# Patient Record
Sex: FEMALE | Race: WHITE
[De-identification: names, ages, dates, MRNs, and addresses within clinical notes are randomized per-mention and may not be internally consistent; named-entity substitution may affect disease eponyms.]

---

## 2020-12-27 ENCOUNTER — HOSPITAL ENCOUNTER (EMERGENCY)
Dept: HOSPITAL 96 - M.ERS | Age: 35
LOS: 1 days | Discharge: HOME | End: 2020-12-28
Payer: COMMERCIAL

## 2020-12-27 VITALS — BODY MASS INDEX: 26.68 KG/M2 | WEIGHT: 166.01 LBS | HEIGHT: 66 IN

## 2020-12-27 DIAGNOSIS — Z88.2: ICD-10-CM

## 2020-12-27 DIAGNOSIS — K50.90: Primary | ICD-10-CM

## 2020-12-27 LAB
ABSOLUTE BASOPHILS: 0.1 THOU/UL (ref 0–0.2)
ABSOLUTE EOSINOPHILS: 0.1 THOU/UL (ref 0–0.7)
ABSOLUTE MONOCYTES: 1 THOU/UL (ref 0–1.2)
ALBUMIN SERPL-MCNC: 3.3 G/DL (ref 3.4–5)
ALP SERPL-CCNC: 71 U/L (ref 46–116)
ALT SERPL-CCNC: 25 U/L (ref 30–65)
ANION GAP SERPL CALC-SCNC: 5 MMOL/L (ref 7–16)
AST SERPL-CCNC: 14 U/L (ref 15–37)
BASOPHILS NFR BLD AUTO: 0.4 %
BILIRUB SERPL-MCNC: 0.2 MG/DL
BILIRUB UR-MCNC: NEGATIVE MG/DL
BUN SERPL-MCNC: 21 MG/DL (ref 7–18)
CALCIUM SERPL-MCNC: 8.8 MG/DL (ref 8.5–10.1)
CHLORIDE SERPL-SCNC: 102 MMOL/L (ref 98–107)
CO2 SERPL-SCNC: 30 MMOL/L (ref 21–32)
COLOR UR: YELLOW
CREAT SERPL-MCNC: 0.8 MG/DL (ref 0.6–1.3)
EOSINOPHIL NFR BLD: 1 %
GLUCOSE SERPL-MCNC: 106 MG/DL (ref 70–99)
GRANULOCYTES NFR BLD MANUAL: 74.9 %
HCT VFR BLD CALC: 38.7 % (ref 37–47)
HGB BLD-MCNC: 12.8 GM/DL (ref 12–15)
KETONES UR STRIP-MCNC: NEGATIVE MG/DL
LIPASE: 86 U/L (ref 73–393)
LYMPHOCYTES # BLD: 1.9 THOU/UL (ref 0.8–5.3)
LYMPHOCYTES NFR BLD AUTO: 15.5 %
MCH RBC QN AUTO: 28.5 PG (ref 26–34)
MCHC RBC AUTO-ENTMCNC: 33.2 G/DL (ref 28–37)
MCV RBC: 85.9 FL (ref 80–100)
MONOCYTES NFR BLD: 8.2 %
MPV: 8.1 FL. (ref 7.2–11.1)
NEUTROPHILS # BLD: 9.1 THOU/UL (ref 1.6–8.1)
NUCLEATED RBCS: 0 /100WBC
PLATELET COUNT*: 331 THOU/UL (ref 150–400)
POTASSIUM SERPL-SCNC: 3.7 MMOL/L (ref 3.5–5.1)
PROT SERPL-MCNC: 7.3 G/DL (ref 6.4–8.2)
PROT UR QL STRIP: NEGATIVE
RBC # BLD AUTO: 4.51 MIL/UL (ref 4.2–5)
RBC # UR STRIP: NEGATIVE /UL
RDW-CV: 13 % (ref 10.5–14.5)
SODIUM SERPL-SCNC: 137 MMOL/L (ref 136–145)
SP GR UR STRIP: >= 1.03 (ref 1–1.03)
URINE CLARITY: CLEAR
URINE GLUCOSE-RANDOM: NEGATIVE
URINE LEUKOCYTES-REFLEX: NEGATIVE
URINE NITRITE-REFLEX: NEGATIVE
UROBILINOGEN UR STRIP-ACNC: 0.2 E.U./DL (ref 0.2–1)
WBC # BLD AUTO: 12.2 THOU/UL (ref 4–11)

## 2020-12-28 VITALS — SYSTOLIC BLOOD PRESSURE: 105 MMHG | DIASTOLIC BLOOD PRESSURE: 58 MMHG

## 2020-12-28 NOTE — EKG
[place] [address]
Phone:  [phone]                     ELECTROCARDIOGRAM REPORT      
_______________________________________________________________________________
 
Name:         MIRTA MCCRACKEN             Room:                     [facility]#:    [id number]     Account #:     [id number]  
Admission:    20    Attend Phys:                     
Discharge:    20    Date of Birth: [birth year]  
Date of Service: 20  Report #:      [id number]
        [id number]
_______________________________________________________________________________
THIS REPORT FOR:  //name//                      
 
                         [facility] ED
                                       
Test Date:    2020               Test Time:    22:28:41
Pat Name:     MIRTA MCCRACKEN              Department:   
Patient ID:   [id number]            Room:          
Gender:                               Technician:   [place]
:          1985               Requested By: Domingo Cross
Order Number: [id number] MD:   Davion Del Castillo
                                 Measurements
Intervals                              Axis          
Rate:         93                       P:            51
MA:           152                      QRS:          47
QRSD:         75                       T:            35
QT:           352                                    
QTc:          438                                    
                           Interpretive Statements
Sinus rhythm
No previous ECG available for comparison
Electronically Signed On 2020 15:12:34 CST by Davion Del Castillo
[email or web address]
 
 
 
 
 
 
 
 
 
 
 
 
 
 
 
 
 
 
 
 
 
 
  <ELECTRONICALLY SIGNED>
                                           By: Davion Del Castillo MD, [facility]     
  20     1512
D: 20   _____________________________________
T: 20 2228   Davion Del Castillo MD, FACC       /EPI

## 2021-12-15 ENCOUNTER — HOSPITAL ENCOUNTER (OUTPATIENT)
Dept: HOSPITAL 96 - M.RAD | Age: 36
End: 2021-12-15
Attending: NURSE PRACTITIONER
Payer: COMMERCIAL

## 2021-12-15 DIAGNOSIS — N64.52: ICD-10-CM

## 2021-12-15 DIAGNOSIS — N64.4: Primary | ICD-10-CM
